# Patient Record
Sex: MALE | Race: BLACK OR AFRICAN AMERICAN | NOT HISPANIC OR LATINO | URBAN - METROPOLITAN AREA
[De-identification: names, ages, dates, MRNs, and addresses within clinical notes are randomized per-mention and may not be internally consistent; named-entity substitution may affect disease eponyms.]

---

## 2017-04-24 ENCOUNTER — EMERGENCY (EMERGENCY)
Facility: HOSPITAL | Age: 27
LOS: 1 days | Discharge: HOME | End: 2017-04-24
Attending: EMERGENCY MEDICINE | Admitting: EMERGENCY MEDICINE
Payer: MEDICAID

## 2017-04-24 VITALS
RESPIRATION RATE: 18 BRPM | TEMPERATURE: 99 F | SYSTOLIC BLOOD PRESSURE: 129 MMHG | OXYGEN SATURATION: 98 % | HEART RATE: 87 BPM | DIASTOLIC BLOOD PRESSURE: 78 MMHG

## 2017-04-24 VITALS
WEIGHT: 186.95 LBS | RESPIRATION RATE: 16 BRPM | HEIGHT: 73 IN | HEART RATE: 89 BPM | TEMPERATURE: 98 F | SYSTOLIC BLOOD PRESSURE: 94 MMHG | OXYGEN SATURATION: 99 % | DIASTOLIC BLOOD PRESSURE: 52 MMHG

## 2017-04-24 DIAGNOSIS — R45.1 RESTLESSNESS AND AGITATION: ICD-10-CM

## 2017-04-24 DIAGNOSIS — F31.9 BIPOLAR DISORDER, UNSPECIFIED: ICD-10-CM

## 2017-04-24 DIAGNOSIS — R46.1 BIZARRE PERSONAL APPEARANCE: ICD-10-CM

## 2017-04-24 LAB
AMPHET UR-MCNC: NEGATIVE — SIGNIFICANT CHANGE UP
APAP SERPL-MCNC: <7.5 UG/ML — LOW (ref 10–26)
APPEARANCE UR: CLEAR — SIGNIFICANT CHANGE UP
BACTERIA # UR AUTO: ABNORMAL
BARBITURATES UR SCN-MCNC: NEGATIVE — SIGNIFICANT CHANGE UP
BENZODIAZ UR-MCNC: POSITIVE
BILIRUB UR-MCNC: NEGATIVE — SIGNIFICANT CHANGE UP
COCAINE METAB.OTHER UR-MCNC: NEGATIVE — SIGNIFICANT CHANGE UP
COLOR SPEC: YELLOW — SIGNIFICANT CHANGE UP
DIFF PNL FLD: NEGATIVE — SIGNIFICANT CHANGE UP
EOSINOPHIL # BLD AUTO: 0 K/UL — SIGNIFICANT CHANGE UP (ref 0–0.5)
EOSINOPHIL NFR BLD AUTO: 0.2 % — SIGNIFICANT CHANGE UP (ref 0–5)
EPI CELLS # UR: SIGNIFICANT CHANGE UP
ETHANOL SERPL-MCNC: <10 MG/DL — SIGNIFICANT CHANGE UP
GLUCOSE UR QL: NEGATIVE MG/DL — SIGNIFICANT CHANGE UP
HCT VFR BLD CALC: 39.7 % — LOW (ref 42–52)
HGB BLD-MCNC: 13.1 G/DL — LOW (ref 14–18)
KETONES UR-MCNC: ABNORMAL
LEUKOCYTE ESTERASE UR-ACNC: ABNORMAL
LYMPHOCYTES # BLD AUTO: 1 K/UL — SIGNIFICANT CHANGE UP (ref 1–4.8)
LYMPHOCYTES # BLD AUTO: 9 % — LOW (ref 20–55)
MCHC RBC-ENTMCNC: 25.8 PG — LOW (ref 27–31)
MCHC RBC-ENTMCNC: 33 G/DL — SIGNIFICANT CHANGE UP (ref 32–36)
MCV RBC AUTO: 78.3 FL — LOW (ref 80–94)
METHADONE UR-MCNC: NEGATIVE — SIGNIFICANT CHANGE UP
MONOCYTES # BLD AUTO: 0.7 K/UL — SIGNIFICANT CHANGE UP (ref 0–0.8)
MONOCYTES NFR BLD AUTO: 6.9 % — SIGNIFICANT CHANGE UP (ref 3–10)
NEUTROPHILS # BLD AUTO: 8.9 K/UL — HIGH (ref 1.8–8)
NEUTROPHILS NFR BLD AUTO: 83.8 % — HIGH (ref 37–73)
NITRITE UR-MCNC: NEGATIVE — SIGNIFICANT CHANGE UP
OPIATES UR-MCNC: NEGATIVE — SIGNIFICANT CHANGE UP
PCP SPEC-MCNC: SIGNIFICANT CHANGE UP
PCP UR-MCNC: NEGATIVE — SIGNIFICANT CHANGE UP
PH UR: 6 — SIGNIFICANT CHANGE UP (ref 5–8)
PLATELET # BLD AUTO: 280 K/UL — SIGNIFICANT CHANGE UP (ref 150–400)
PROT UR-MCNC: 15 MG/DL
RBC # BLD: 5.07 M/UL — SIGNIFICANT CHANGE UP (ref 4.6–6.2)
RBC # FLD: 13.3 % — SIGNIFICANT CHANGE UP (ref 11–15.6)
SALICYLATES SERPL-MCNC: <2 MG/DL — LOW (ref 10–20)
SP GR SPEC: 1.02 — SIGNIFICANT CHANGE UP (ref 1.01–1.02)
THC UR QL: NEGATIVE — SIGNIFICANT CHANGE UP
TSH SERPL-MCNC: 3.71 UIU/ML — SIGNIFICANT CHANGE UP (ref 0.27–4.2)
UROBILINOGEN FLD QL: NEGATIVE MG/DL — SIGNIFICANT CHANGE UP
WBC # BLD: 10.6 K/UL — SIGNIFICANT CHANGE UP (ref 4.8–10.8)
WBC # FLD AUTO: 10.6 K/UL — SIGNIFICANT CHANGE UP (ref 4.8–10.8)
WBC UR QL: SIGNIFICANT CHANGE UP

## 2017-04-24 PROCEDURE — 99284 EMERGENCY DEPT VISIT MOD MDM: CPT | Mod: 25

## 2017-04-24 PROCEDURE — 93005 ELECTROCARDIOGRAM TRACING: CPT

## 2017-04-24 PROCEDURE — 81001 URINALYSIS AUTO W/SCOPE: CPT

## 2017-04-24 PROCEDURE — 80053 COMPREHEN METABOLIC PANEL: CPT

## 2017-04-24 PROCEDURE — 80307 DRUG TEST PRSMV CHEM ANLYZR: CPT

## 2017-04-24 PROCEDURE — 99284 EMERGENCY DEPT VISIT MOD MDM: CPT

## 2017-04-24 PROCEDURE — 90792 PSYCH DIAG EVAL W/MED SRVCS: CPT

## 2017-04-24 PROCEDURE — 93010 ELECTROCARDIOGRAM REPORT: CPT

## 2017-04-24 PROCEDURE — 85027 COMPLETE CBC AUTOMATED: CPT

## 2017-04-24 PROCEDURE — 84443 ASSAY THYROID STIM HORMONE: CPT

## 2017-04-24 NOTE — ED ADULT NURSE REASSESSMENT NOTE - NS ED NURSE REASSESS COMMENT FT1
report given to Jesus in behavior health.
Patient spoke to MD and RN. Mood is agitated and minimal disclosure. Minimizing reason for coming to hospital, and reports he was just discharged last week from [facility] last week. Focused on leaving and going back home. Pt stated he asked for "things he wanted at work", and he "got mad" when he didn't get them. Pt stated he then calmed down, but was brought to hospital against his will after police was called. MD assessment ongoing to determine appropriate disposition plan.

## 2017-04-24 NOTE — ED BEHAVIORAL HEALTH ASSESSMENT NOTE - SAFETY PLAN DETAILS
Please go to [facility] or call 911, If you notice any of the followin. Agitation, Aggression or Anxiety,    2. Suicidal or homicidal thoughts 3. Side effects of medication  4. Worsening of symptoms

## 2017-04-24 NOTE — ED BEHAVIORAL HEALTH ASSESSMENT NOTE - DESCRIPTION
Patient is tall, and appears intimidating with blunted affect. He did not exhibit any violence or aggression. He was cooperative throughout. h/o heart surgery Family is from [facility], but pt was born here. He has one brother and one sister. Lives with parents

## 2017-04-24 NOTE — ED ADULT TRIAGE NOTE - CHIEF COMPLAINT QUOTE
BIBA, patient is awake and oriented to person and place, sent to the Ed for eval of a psychiatric outburst, patient was given IM haldol and IM versed, denies any si, but states he may hurt someone if provoked

## 2017-04-24 NOTE — ED BEHAVIORAL HEALTH ASSESSMENT NOTE - SUMMARY
Patient is a 26 year old, male; domicile with parents and 2 siblings; single; non-caregiver; unemployed ; PPH of reported bipolar disorder;  with two prior psychiatric hospitalizations-last discharged two weeks ago from [facility]; no known suicide attempts; no known history of violence or arrests; no active substance abuse or known history of complicated withdrawal; brought in by EMS; called by work agency ; presenting for evaluation of  agitation.  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  Patient refused to leave until they gave him a job and police were called.  A confrontation ensued, Patient reports he cursed at police and they gave him PRN, he denies any physical aggression towards anyone.  He denies any S/H I/I/P. As per father, pt has not been violent or aggressive since discharge from hospital. He has not taken any medications because he is "too busy".  He was in good behavioral control in ER, and verbalized that he would have to look for job elsewhere.  No need for acute inpatient psychiatric hospitalization. Encouraged pt to restart medications which he reported he would consider.  Patient to follow up this week at [facility]

## 2017-04-24 NOTE — ED BEHAVIORAL HEALTH ASSESSMENT NOTE - RISK ASSESSMENT
Low to moderate: Patient is not currently psychotic, denies depressed mood, denies h/o violence, aggression or suicidality, no prior suicide attempts, good support, remains future oriented, no substance use history, denies high psychic anxiety, denies insomnia, denies hopelessness,  he does have concrete TP, with limited insight and impulsivity which increase his chronic risk

## 2017-04-24 NOTE — ED BEHAVIORAL HEALTH ASSESSMENT NOTE - HPI (INCLUDE ILLNESS QUALITY, SEVERITY, DURATION, TIMING, CONTEXT, MODIFYING FACTORS, ASSOCIATED SIGNS AND SYMPTOMS)
Patient is a 26 year old, male; domicile with parents and 2 siblings; single; non-caregiver; unemployed ; PPH of reported bipolar disorder;  with two prior psychiatric hospitalizations-last discharged two weeks ago from [facility]; no known suicide attempts; no known history of violence or arrests; no active substance abuse or known history of complicated withdrawal; brought in by EMS; called by work agency ; presenting for evaluation of  agitation      pt was recently discharged from [facility] two weeks ago. He has outpatient follow up this week. Today pt went to [facility] because he wanted to get his job back.  He reports that he brought paper work from hospital discharged but they refused to give him his job back.  Patient reported that he was not going to leave until his job was given to him.  [employer] was called.  Patient denies any physical violence however admits to cursing at officers.  He received Halsol 5 mg and 5 mg of Versed at 14:12. He received a second dose of versed at 14:23. Patient father arrived before departure from the scene and pt was brought to [facility] for further evaluation.       Patient admits that he has not taken medications since discharge because he has been "too busy".  He feels the agency was wrong to give him his job, but concedes that he will have to look elsewhere for work. Concerning other psychiatric symptoms pt denies depressed mood, continues to enjoy  pleasurable activities.   Denies any suicidal ideation, intent or plan. Denies any physically  violent behavior towards others.  Patient admits to feeling irritable, but he denies any episodes of bizarre happiness, unusual energy, unusual nightime excitation or other common symptoms of sana.  Denies hearing voices or seeing things.  No delusions were elicited.  Patient denies pervasive anxiety,  panic attacks, obsessions or compulsions.       Spoke with father who reported that pt has been doing well since discharge.  He reports that he was not given back to work papers from hospital since discharge but pt did not want to accept that and wanted to return to work.  He reported that pt did not want to leave agency until they gave him work. He confirmed that pt had not been physically violent, or engaging in any dangerous or suicidal behavior since discharge.  He does not believe that pt takes medications. He feels comfortable with pt being discharged back home. He reports that pt likes to work and that the other subject that he talks about is getting a girlfriend.

## 2017-04-24 NOTE — ED BEHAVIORAL HEALTH NOTE - BEHAVIORAL HEALTH NOTE
SWNote: pt seen by psych MD (Dr Garza) .Pt denies any SI/HI at this moment,states that he has an appt at [facility] on Wednesday 04/25/2017 pt will f/u accordingly. Pt's father on his way to  pt. No other concerns reported at this moment.

## 2017-04-24 NOTE — ED BEHAVIORAL HEALTH ASSESSMENT NOTE - SUICIDE PROTECTIVE FACTORS
Supportive social network or family/Responsibility to family and others/Identifies reasons for living/Future oriented

## 2017-04-24 NOTE — ED BEHAVIORAL HEALTH ASSESSMENT NOTE - DETAILS
Discharged from [place] [facility] 2 weeks ago no available Father's aunt with h/o bipolar disorder. H/o psychiatric illness on mother's side NA

## 2017-04-24 NOTE — ED BEHAVIORAL HEALTH ASSESSMENT NOTE - OTHER PAST PSYCHIATRIC HISTORY (INCLUDE DETAILS REGARDING ONSET, COURSE OF ILLNESS, INPATIENT/OUTPATIENT TREATMENT)
Patient has two prior psychiatric hospitalizations. The first one was 7 years ago when he presented to [place] [facility] and more recently at [facility].  The last trigger occurred when pt became irritable and possibly paranoid about  when he brought in car for a fix.  Patient has no known suicide history and history of non compliance as an outpatient

## 2017-04-24 NOTE — ED ADULT NURSE NOTE - OBJECTIVE STATEMENT
26 year old a&ox3 male comes to ed. as per pt. he states he came home and there were  at his house. pt. not willingly to talk about what happened. pt. appears frustrated. pt. states he just wants to go home. iv placed. labs sent. will continue to monitor.

## 2017-04-25 NOTE — ED PROVIDER NOTE - OBJECTIVE STATEMENT
pt presents with increased agitation after being refused to be allowed to return to work. denies fever. denies HA or neck pain. no chest pain or sob. no abd pain. no n/v/d. no urinary f/u/d. no back pain. no motor or sensory deficits. denies drug use. no recent travel. no rash. no other acute issues symptoms or concerns no suicidalor homicidal ideation no hallcuinations he is complaint with medications

## 2017-08-22 ENCOUNTER — OUTPATIENT (OUTPATIENT)
Dept: OUTPATIENT SERVICES | Facility: HOSPITAL | Age: 27
LOS: 1 days | End: 2017-08-22
Payer: MEDICAID

## 2017-08-22 VITALS
TEMPERATURE: 98 F | OXYGEN SATURATION: 99 % | DIASTOLIC BLOOD PRESSURE: 67 MMHG | RESPIRATION RATE: 16 BRPM | HEART RATE: 76 BPM | SYSTOLIC BLOOD PRESSURE: 125 MMHG

## 2017-08-22 DIAGNOSIS — Z98.890 OTHER SPECIFIED POSTPROCEDURAL STATES: Chronic | ICD-10-CM

## 2017-08-22 DIAGNOSIS — Z01.810 ENCOUNTER FOR PREPROCEDURAL CARDIOVASCULAR EXAMINATION: ICD-10-CM

## 2017-08-22 LAB
ALBUMIN SERPL ELPH-MCNC: 3.7 G/DL — SIGNIFICANT CHANGE UP (ref 3.3–5.2)
ALP SERPL-CCNC: 92 U/L — SIGNIFICANT CHANGE UP (ref 40–120)
ALT FLD-CCNC: 23 U/L — SIGNIFICANT CHANGE UP
ANION GAP SERPL CALC-SCNC: 13 MMOL/L — SIGNIFICANT CHANGE UP (ref 5–17)
APTT BLD: 28.4 SEC — SIGNIFICANT CHANGE UP (ref 27.5–37.4)
AST SERPL-CCNC: 17 U/L — SIGNIFICANT CHANGE UP
BILIRUB SERPL-MCNC: 0.3 MG/DL — LOW (ref 0.4–2)
BUN SERPL-MCNC: 12 MG/DL — SIGNIFICANT CHANGE UP (ref 8–20)
CALCIUM SERPL-MCNC: 9.2 MG/DL — SIGNIFICANT CHANGE UP (ref 8.6–10.2)
CHLORIDE SERPL-SCNC: 104 MMOL/L — SIGNIFICANT CHANGE UP (ref 98–107)
CO2 SERPL-SCNC: 23 MMOL/L — SIGNIFICANT CHANGE UP (ref 22–29)
CREAT SERPL-MCNC: 1.02 MG/DL — SIGNIFICANT CHANGE UP (ref 0.5–1.3)
GLUCOSE SERPL-MCNC: 86 MG/DL — SIGNIFICANT CHANGE UP (ref 70–115)
HCT VFR BLD CALC: 37.9 % — LOW (ref 42–52)
HGB BLD-MCNC: 12.6 G/DL — LOW (ref 14–18)
INR BLD: 1.12 RATIO — SIGNIFICANT CHANGE UP (ref 0.88–1.16)
MCHC RBC-ENTMCNC: 26.1 PG — LOW (ref 27–31)
MCHC RBC-ENTMCNC: 33.2 G/DL — SIGNIFICANT CHANGE UP (ref 32–36)
MCV RBC AUTO: 78.6 FL — LOW (ref 80–94)
PLATELET # BLD AUTO: 232 K/UL — SIGNIFICANT CHANGE UP (ref 150–400)
POTASSIUM SERPL-MCNC: 4.1 MMOL/L — SIGNIFICANT CHANGE UP (ref 3.5–5.3)
POTASSIUM SERPL-SCNC: 4.1 MMOL/L — SIGNIFICANT CHANGE UP (ref 3.5–5.3)
PROT SERPL-MCNC: 7.1 G/DL — SIGNIFICANT CHANGE UP (ref 6.6–8.7)
PROTHROM AB SERPL-ACNC: 12.3 SEC — SIGNIFICANT CHANGE UP (ref 9.8–12.7)
RBC # BLD: 4.82 M/UL — SIGNIFICANT CHANGE UP (ref 4.6–6.2)
RBC # FLD: 13.8 % — SIGNIFICANT CHANGE UP (ref 11–15.6)
SODIUM SERPL-SCNC: 140 MMOL/L — SIGNIFICANT CHANGE UP (ref 135–145)
WBC # BLD: 5.6 K/UL — SIGNIFICANT CHANGE UP (ref 4.8–10.8)
WBC # FLD AUTO: 5.6 K/UL — SIGNIFICANT CHANGE UP (ref 4.8–10.8)

## 2017-08-22 PROCEDURE — 85730 THROMBOPLASTIN TIME PARTIAL: CPT

## 2017-08-22 PROCEDURE — 85610 PROTHROMBIN TIME: CPT

## 2017-08-22 PROCEDURE — 36415 COLL VENOUS BLD VENIPUNCTURE: CPT

## 2017-08-22 PROCEDURE — G0463: CPT

## 2017-08-22 PROCEDURE — 93010 ELECTROCARDIOGRAM REPORT: CPT

## 2017-08-22 PROCEDURE — 80053 COMPREHEN METABOLIC PANEL: CPT

## 2017-08-22 PROCEDURE — 85027 COMPLETE CBC AUTOMATED: CPT

## 2017-08-22 PROCEDURE — 93005 ELECTROCARDIOGRAM TRACING: CPT

## 2017-08-22 NOTE — H&P ADULT - NSHPLABSRESULTS_GEN_ALL_CORE
12.6   5.6   )-----------( 232      ( 22 Aug 2017 09:04 )             37.9     PT/INR - ( 22 Aug 2017 09:04 )   PT: 12.3 sec;   INR: 1.12 ratio         PTT - ( 22 Aug 2017 09:04 )  PTT:28.4 sec    Comprehensive Metabolic Panel (08.22.17 @ 09:04)    Sodium, Serum: 140 mmol/L    Potassium, Serum: 4.1 mmol/L    Chloride, Serum: 104 mmol/L    Carbon Dioxide, Serum: 23.0 mmol/L    Anion Gap, Serum: 13 mmol/L    Blood Urea Nitrogen, Serum: 12.0 mg/dL    Creatinine, Serum: 1.02 mg/dL    Glucose, Serum: 86 mg/dL    Calcium, Total Serum: 9.2 mg/dL    Protein Total, Serum: 7.1 g/dL    Albumin, Serum: 3.7 g/dL    Bilirubin Total, Serum: 0.3 mg/dL    Alkaline Phosphatase, Serum: 92 U/L    Aspartate Aminotransferase (AST/SGOT): 17 U/L    Alanine Aminotransferase (ALT/SGPT): 23 U/L    eGFR if Non : 100: Interpretative comment  The units for eGFR are ml/min/1.73m2 (normalized body surface area). The  eGFR is calculated from a serum creatinine using the CKD-EPI equation.  Other variables required for calculation are race, age and sex. Among  patients w100: ith chronic kidney disease (CKD), the eGFR is useful in  determining the stage of disease according to KDOQI CKD classification.  All eGFR results are reported numerically with the following  interpretation.          GFR                    [id number]:                  Without     (ml/min/1.73 m2)    Kidney Damage       Kidney Damage        >= 90                    Stage 1                     Normal        60-89                    Stage 2                     Decreased GFR        [id number]:      Stage 3                     Stage 3        15-29                    Stage 4                     Stage 4        < 15                      Stage 5                     Stage 5  Each stage of CKD assumes that the associated GFR level has been in  [id number]: ect for at least 3 months. Determination of stages one and two (with  eGFR > 59 ml/min/m2) requires estimation of kidney damage for at least 3  months as defined by structural or functional abnormalities.  Limitations: All estimates of GFR will be [id number]: s accurate for patients at  extremes of muscle mass (including but not limited to frail elderly,  critically ill, or cancer patients), those with unusual diets, and those  with conditions associated with reduced secretion or extrarenal  elimination of100:  creatinine. The eGFR equation is not recommended for use  in patients with unstable creatinine levels. mL/min/1.73M2    eGFR if African American: 115 mL/min/1.73M2

## 2017-08-22 NOTE — ASU PATIENT PROFILE, ADULT - PSH
Status post closure of congenital atrial septal defect (ASD) by percutaneous transcatheter technique

## 2017-08-22 NOTE — H&P ADULT - NSHPPHYSICALEXAM_GEN_ALL_CORE
General: NAD, well-groomed, well-developed  HEENT:  NC/AT  Neck: supple, no JVD, no bruit noted  Respiratory: clear to auscultation bilaterally, no wheeze, no rhonchi, no crackles noted  Cardiovascular: regular rate and rhythm, S1 and S2 audible, no murmur, gallop or rub noted  GI: bowel sounds present x4, abdomen soft, non tender, non distended  Peripheral Vascular: 2+ peripheral pulses, no clubbing, cyanosis or edema, no varicosities noted  Musculoskeletal: 5/5 strength bilateral upper and lower extremities  Psychiatric: Normal mood, normal affect observed  Neuro: alert and oriented x 4, gait steady, speech clear, no focal deficits noted  ASA-2  MALLAMPATI-2

## 2017-08-22 NOTE — H&P ADULT - NSHPREVIEWOFSYSTEMS_GEN_ALL_CORE
General:  no weakness, fatigue, fevers or chills  Skin: no itching, burning, rashes, or lesions   HEENT: no visual changes;  no headache, no vertigo, no recent colds, nasal stuffiness or sore throat   Neck: no pain, stiffness or swollen glands  Respiratory: no cough, sputum, wheezing, hemoptysis; no shortness of breath  Cardiovascular: + rare right sided chest pain, no dyspnea or palpitations  GI: no abdominal or epigastric pain. no heartburn, nausea, vomiting, or hematemesis; no diarrhea or constipation. no melena or hematochezia  : no dysuria, frequency or hematuria  Peripheral Vascular: no intermittent claudication, leg cramps, varicose veins, swelling or swelling with redness and tenderness  Musculoskeletal: no muscle or joint pain, stiffness, arthritis, gout, backache, neck or lower back pain  Psychiatric: no depression, nervousness, mood change or suicide attempts  Neuro: no changes in orientation, memory, insight or judgment, no changes in mood, attention or speech.  no dizziness, vertigo or fainting, numbness, tingling or weakness

## 2017-08-22 NOTE — H&P ADULT - NSHPSOCIALHISTORY_GEN_ALL_CORE
single  lives with parents  unemployed    alcohol use-rare/social  tobacco use-denies  illicit drug use-denies

## 2017-08-22 NOTE — H&P ADULT - HISTORY OF PRESENT ILLNESS
26 year old male with PMHx of ASD closure with right sided enlargement and dysfunction, schizophrenia, depression that presents for RHC to evaluate RV/PA pressures.

## 2017-08-25 ENCOUNTER — TRANSCRIPTION ENCOUNTER (OUTPATIENT)
Age: 27
End: 2017-08-25

## 2017-08-25 ENCOUNTER — OUTPATIENT (OUTPATIENT)
Dept: OUTPATIENT SERVICES | Facility: HOSPITAL | Age: 27
LOS: 1 days | End: 2017-08-25
Payer: MEDICAID

## 2017-08-25 VITALS
OXYGEN SATURATION: 100 % | HEART RATE: 74 BPM | DIASTOLIC BLOOD PRESSURE: 70 MMHG | RESPIRATION RATE: 20 BRPM | SYSTOLIC BLOOD PRESSURE: 120 MMHG

## 2017-08-25 VITALS
DIASTOLIC BLOOD PRESSURE: 62 MMHG | RESPIRATION RATE: 22 BRPM | HEART RATE: 78 BPM | TEMPERATURE: 98 F | SYSTOLIC BLOOD PRESSURE: 114 MMHG | OXYGEN SATURATION: 100 %

## 2017-08-25 DIAGNOSIS — Z98.890 OTHER SPECIFIED POSTPROCEDURAL STATES: Chronic | ICD-10-CM

## 2017-08-25 DIAGNOSIS — Q21.1 ATRIAL SEPTAL DEFECT: ICD-10-CM

## 2017-08-25 DIAGNOSIS — Z01.810 ENCOUNTER FOR PREPROCEDURAL CARDIOVASCULAR EXAMINATION: ICD-10-CM

## 2017-08-25 PROCEDURE — 93451 RIGHT HEART CATH: CPT

## 2017-08-25 PROCEDURE — C1769: CPT

## 2017-08-25 PROCEDURE — C1889: CPT

## 2017-08-25 PROCEDURE — C1894: CPT

## 2017-08-25 PROCEDURE — 93451 RIGHT HEART CATH: CPT | Mod: 26

## 2017-08-25 PROCEDURE — C1887: CPT

## 2017-08-25 RX ORDER — VENLAFAXINE HCL 75 MG
1 CAPSULE, EXT RELEASE 24 HR ORAL
Qty: 0 | Refills: 0 | COMMUNITY

## 2017-08-25 RX ORDER — DIVALPROEX SODIUM 500 MG/1
1 TABLET, DELAYED RELEASE ORAL
Qty: 0 | Refills: 0 | COMMUNITY

## 2017-08-25 RX ORDER — ASPIRIN/CALCIUM CARB/MAGNESIUM 324 MG
325 TABLET ORAL ONCE
Qty: 0 | Refills: 0 | Status: COMPLETED | OUTPATIENT
Start: 2017-08-25 | End: 2017-08-25

## 2017-08-25 RX ORDER — FLUPHENAZINE HYDROCHLORIDE 1 MG/1
1 TABLET, FILM COATED ORAL
Qty: 0 | Refills: 0 | COMMUNITY

## 2017-08-25 RX ORDER — SODIUM CHLORIDE 9 MG/ML
1000 INJECTION INTRAMUSCULAR; INTRAVENOUS; SUBCUTANEOUS
Qty: 0 | Refills: 0 | Status: DISCONTINUED | OUTPATIENT
Start: 2017-08-25 | End: 2017-09-09

## 2017-08-25 RX ADMIN — Medication 325 MILLIGRAM(S): at 10:49

## 2017-08-25 NOTE — DISCHARGE NOTE ADULT - PATIENT PORTAL LINK FT
“You can access the FollowHealth Patient Portal, offered by [facility], by registering with the following website: [email or web address]”

## 2017-08-25 NOTE — DISCHARGE NOTE ADULT - PLAN OF CARE
optimal cardiac function Choose lean meats and poultry without skin and prepare them without added saturated and trans fat.  Eat fish at least twice a week. Recent research shows that eating oily fish containing omega-3 fatty acids (for example, salmon, trout and herring) may help lower your risk of death from coronary artery disease.  Select fat-free, 1 percent fat and low-fat dairy products.  Cut back on foods containing partially hydrogenated vegetable oils to reduce trans fat in your diet.   To lower cholesterol, reduce saturated fat to no more than 5 to 6 percent of total calories. For someone eating 2,000 calories a day, that’s about 13 grams of saturated fat.  Cut back on beverages and foods with added sugars.  Choose and prepare foods with little or no salt. To lower blood pressure, aim to eat no more than 2,400 milligrams of sodium per day. Reducing daily intake to 1,500 mg is desirable because it can lower blood pressure even further.  If you drink alcohol, drink in moderation. That means one drink per day if you’re a woman and two drinks  per day if you’re a man.  Follow the American Heart Association recommendations when you eat out, and keep an eye on your portion sizes.

## 2017-08-25 NOTE — DISCHARGE NOTE ADULT - HOSPITAL COURSE
s/p RHC #5 fr RFV s/p RHC #5 fr RFV   Tolerated procedure well w/o complications  Seen post procedure by Dr. Rai  Family present  REVIEW OF SYSTEMS:  Denies SOB, CP, NV, HA, dizziness, palpitations, site pain  PHYSICAL EXAM: A&Ox3 NAD Skin warm and dry  NEURO: Speech intact +gag +swallow Tongue midline WILD  NECK: No JVD, trachea midline. Eupneic  HEART: RRR S1S2 Tele: SR  PULMONARY:  CTA flavia  ABDOMEN: Soft nontender X4 +BS  EXTREMITIES: RFV site: No bleed, hematoma, pain, ecchymosis or swelling Rt DP/PT+

## 2017-08-25 NOTE — DISCHARGE NOTE ADULT - MEDICATION SUMMARY - MEDICATIONS TO TAKE
I will START or STAY ON the medications listed below when I get home from the hospital:    divalproex sodium 500 mg oral tablet, extended release  -- 1 tab(s) by mouth 2 times a day  -- Indication: For schizophrenic    venlafaxine 37.5 mg oral capsule, extended release  -- 1 cap(s) by mouth once a day  -- Indication: For ASD (atrial septal defect)    fluPHENAZine 5 mg oral tablet  -- 1 tab(s) by mouth 2 times a day  -- Indication: For ASD (atrial septal defect)

## 2017-08-25 NOTE — DISCHARGE NOTE ADULT - CARE PLAN
Principal Discharge DX:	ASD (atrial septal defect)  Goal:	optimal cardiac function  Instructions for follow-up, activity and diet:	Choose lean meats and poultry without skin and prepare them without added saturated and trans fat.  Eat fish at least twice a week. Recent research shows that eating oily fish containing omega-3 fatty acids (for example, salmon, trout and herring) may help lower your risk of death from coronary artery disease.  Select fat-free, 1 percent fat and low-fat dairy products.  Cut back on foods containing partially hydrogenated vegetable oils to reduce trans fat in your diet.   To lower cholesterol, reduce saturated fat to no more than 5 to 6 percent of total calories. For someone eating 2,000 calories a day, that’s about 13 grams of saturated fat.  Cut back on beverages and foods with added sugars.  Choose and prepare foods with little or no salt. To lower blood pressure, aim to eat no more than 2,400 milligrams of sodium per day. Reducing daily intake to 1,500 mg is desirable because it can lower blood pressure even further.  If you drink alcohol, drink in moderation. That means one drink per day if you’re a woman and two drinks  per day if you’re a man.  Follow the American Heart Association recommendations when you eat out, and keep an eye on your portion sizes.

## 2017-08-25 NOTE — DISCHARGE NOTE ADULT - INSTRUCTIONS
Activities as tolerated minimize stair climbing, heavy lifting greater than 10lbs, strenous house work, contact sports,No intercourse for 1 week. Site care no Bath tubs, Hot tubs , Pools for 1 week. Monitor site for infection such as warmth drainage or swelling of site. Monitor for bleeding call MD if continous bleeding occurs hold pressure report to nearest ER, Do not opperate heavy machinery or drive for 24hours.  REMOVE ALL DRESSINGS IN 24 HOURS discharge instructions

## 2018-01-31 ENCOUNTER — RECORD ABSTRACTING (OUTPATIENT)
Age: 28
End: 2018-01-31

## 2018-01-31 DIAGNOSIS — Z86.59 PERSONAL HISTORY OF OTHER MENTAL AND BEHAVIORAL DISORDERS: ICD-10-CM

## 2018-01-31 DIAGNOSIS — Z78.9 OTHER SPECIFIED HEALTH STATUS: ICD-10-CM

## 2018-01-31 DIAGNOSIS — F32.9 MAJOR DEPRESSIVE DISORDER, SINGLE EPISODE, UNSPECIFIED: ICD-10-CM

## 2018-01-31 DIAGNOSIS — R74.8 ABNORMAL LEVELS OF OTHER SERUM ENZYMES: ICD-10-CM

## 2018-01-31 DIAGNOSIS — F20.9 SCHIZOPHRENIA, UNSPECIFIED: ICD-10-CM

## 2018-10-31 ENCOUNTER — RECORD ABSTRACTING (OUTPATIENT)
Age: 28
End: 2018-10-31

## 2018-11-01 ENCOUNTER — APPOINTMENT (OUTPATIENT)
Dept: CARDIOLOGY | Facility: CLINIC | Age: 28
End: 2018-11-01
Payer: MEDICAID

## 2018-11-01 VITALS
SYSTOLIC BLOOD PRESSURE: 113 MMHG | HEIGHT: 73 IN | RESPIRATION RATE: 14 BRPM | DIASTOLIC BLOOD PRESSURE: 72 MMHG | WEIGHT: 132 LBS | BODY MASS INDEX: 17.49 KG/M2 | OXYGEN SATURATION: 97 % | HEART RATE: 76 BPM

## 2018-11-01 PROCEDURE — 99214 OFFICE O/P EST MOD 30 MIN: CPT

## 2018-11-01 PROCEDURE — 93000 ELECTROCARDIOGRAM COMPLETE: CPT

## 2018-11-02 PROBLEM — F32.9 MAJOR DEPRESSIVE DISORDER, SINGLE EPISODE, UNSPECIFIED: Chronic | Status: ACTIVE | Noted: 2017-08-22

## 2018-11-02 PROBLEM — Q21.1 ATRIAL SEPTAL DEFECT: Chronic | Status: ACTIVE | Noted: 2017-08-22

## 2018-11-02 PROBLEM — F20.9 SCHIZOPHRENIA, UNSPECIFIED: Chronic | Status: ACTIVE | Noted: 2017-08-22

## 2018-12-27 ENCOUNTER — APPOINTMENT (OUTPATIENT)
Dept: CARDIOLOGY | Facility: CLINIC | Age: 28
End: 2018-12-27
Payer: MEDICAID

## 2018-12-27 PROCEDURE — 93306 TTE W/DOPPLER COMPLETE: CPT

## 2019-04-18 ENCOUNTER — NON-APPOINTMENT (OUTPATIENT)
Age: 29
End: 2019-04-18

## 2019-04-18 ENCOUNTER — APPOINTMENT (OUTPATIENT)
Dept: CARDIOLOGY | Facility: CLINIC | Age: 29
End: 2019-04-18
Payer: MEDICAID

## 2019-04-18 VITALS
OXYGEN SATURATION: 98 % | BODY MASS INDEX: 31.81 KG/M2 | HEART RATE: 79 BPM | RESPIRATION RATE: 14 BRPM | SYSTOLIC BLOOD PRESSURE: 123 MMHG | WEIGHT: 240 LBS | HEIGHT: 73 IN | DIASTOLIC BLOOD PRESSURE: 85 MMHG

## 2019-04-18 PROCEDURE — 93000 ELECTROCARDIOGRAM COMPLETE: CPT

## 2019-04-18 PROCEDURE — 99214 OFFICE O/P EST MOD 30 MIN: CPT

## 2019-04-18 RX ORDER — CLONAZEPAM 2 MG/1
TABLET ORAL
Refills: 0 | Status: DISCONTINUED | COMMUNITY
End: 2019-04-18

## 2019-04-18 NOTE — PHYSICAL EXAM
[Normal Conjunctiva] : the conjunctiva exhibited no abnormalities [General Appearance - In No Acute Distress] : no acute distress [Normal Oral Mucosa] : normal oral mucosa [Normal Rate] : normal [Auscultation Breath Sounds / Voice Sounds] : lungs were clear to auscultation bilaterally [Rhythm Regular] : regular [Normal S1] : normal S1 [Normal S2] : normal S2 [II] : a grade 2 [Abdomen Soft] : soft [Abdomen Tenderness] : non-tender [Nail Clubbing] : no clubbing of the fingernails [Abnormal Walk] : normal gait [Skin Color & Pigmentation] : normal skin color and pigmentation [Cyanosis, Localized] : no localized cyanosis [Oriented To Time, Place, And Person] : oriented to person, place, and time [Affect] : the affect was normal [FreeTextEntry1] : No JVD, no carotid bruits. [S3] : no S3 [S4] : no S4

## 2019-04-18 NOTE — DISCUSSION/SUMMARY
[FreeTextEntry1] : 1. Cardiac CT to further evaluate for residual shunting or causes for right-sided enlargement.\par 2. No additional cardiac medications at this time.\par 3. He can exercise freely without restriction. \par 4. Will discuss the results of his CAT scan over the phone and plan followup in the office in 6 months.

## 2019-04-18 NOTE — ASSESSMENT
[FreeTextEntry1] : EKG:  SR with first degree AV block and RBBB.\par \par Echocardiogram December 27, 2018 showed left ventricle normal in size and function with ejection fraction of 55-60%. Right ventricle is moderately dilated with moderately reduced function. Right atrium is moderately dilated. No significant valvulopathy. Aortic root borderline dilated. Right-sided chambers appear further dilated compared to prior study.\par \par 28 y/o male with PMHx ASD s/p closure, right sided cardiomegaly presenting for f/u.  Patient is asymptomatic at this time. No evidence of CHF. Echocardiogram again shows right-sided enlargement but this seems to have worsened. Catheterization in 2017 showed normal pulmonary pressures and no evidence of residual shunt. Given the continued increase in size of his right side would recommend CAT scan to look further at this time.

## 2019-04-18 NOTE — HISTORY OF PRESENT ILLNESS
[FreeTextEntry1] : Patient returns to the office today noting he has been stressed to him that that has not been exercising. He reports no actual symptoms when he exerts himself but has not been exerting himself very much. He had an abscess drained in his abdominal wall. He has had no further chest pain. Patient denies shortness of breath, palpitations, orthopnea, presyncope, syncope.

## 2019-05-08 NOTE — ED ADULT NURSE NOTE - CARDIO WDL
Nephrology Followup Note - [phone] - Dr Bahena / Dr Regalado / Dr Benz / Dr Frost / Dr Mamhood / Dr Mcgee / Dr Guzman / Dr Guerra  Pt seen and examined at bedside  Pt appears restless and anxious, not answering questions.   Not in apparent respiratory distress. Afebrile     Allergies:  oxycodone (Vomiting; Nausea)    Hospital Medications:   MEDICATIONS  (STANDING):  allopurinol 300 milliGRAM(s) Oral daily  carvedilol 25 milliGRAM(s) Oral every 12 hours  chlorhexidine 4% Liquid 1 Application(s) Topical <User Schedule>  filgrastim-sndz Injectable 480 MICROGram(s) SubCutaneous daily  FIRST- Mouthwash  BLM 5 milliLiter(s) Swish and Spit five times a day  fluconAZOLE IVPB 200 milliGRAM(s) IV Intermittent every 24 hours  metroNIDAZOLE  IVPB 500 milliGRAM(s) IV Intermittent every 8 hours  metroNIDAZOLE  IVPB      ranitidine 150 milliGRAM(s) Oral every 24 hours  Saliva Substitute (CAPHOSOL) 30 milliLiter(s) Swish and Spit every 6 hours  valACYclovir 500 milliGRAM(s) Oral <User Schedule>  vancomycin    Solution 125 milliGRAM(s) Oral every 6 hours    VITALS:  T(F): 98 (19 @ 07:31), Max: 99 (19 @ 02:41)  HR: 108 (19 @ 07:31)  BP: 120/66 (19 @ 07:31)  RR: 20 (19 @ 07:31)  SpO2: 100% (19 @ 07:31)  Wt(kg): --     @ 07:01  -   @ 07:00  --------------------------------------------------------  IN: 0 mL / OUT: 450 mL / NET: -450 mL    PHYSICAL EXAM:  Constitutional: NAD  HEENT: anicteric sclera, bloody mucous with scabs.   Neck: No JVD  Respiratory: CTAB, no wheezes, rales or rhonchi  Cardiovascular: S1, S2, RRR  Gastrointestinal: BS+, soft, NT/ND  Extremities: No cyanosis or clubbing. No peripheral edema  Neurological: A/O x 0, no focal deficits  : No CVA tenderness. No quintero.   Skin: +purpura rash     LABS:      139  |  102  |  92<H>  ----------------------------<  158<H>  3.9   |  13<L>  |  9.68<H>    Ca    6.2<LL>      08 May 2019 06:38  Phos  5.7       Mg     2.2           Creatinine Trend: 9.68 <--, 7.77 <--, 8.51 <--, 6.88 <--, 8.02 <--, 6.69 <--, 5.21 <--                        6.3    3.26  )-----------( 33       ( 08 May 2019 06:38 )             18.9     Urine Studies:  Urinalysis Basic - ( 02 May 2019 14:30 )    Color: YELLOW / Appearance: Lt TURBID / S.015 / pH: 6.0  Gluc: NEGATIVE / Ketone: TRACE  / Bili: NEGATIVE / Urobili: NORMAL   Blood: SMALL / Protein: 300 / Nitrite: NEGATIVE   Leuk Esterase: NEGATIVE / RBC: 6-10 / WBC 11-25   Sq Epi: FEW / Non Sq Epi:  / Bacteria: NF      Sodium, Random Urine: 24 mmol/L ( @ 18:30)  Potassium, Random Urine: 28.6 mmol/L ( @ 18:30)  Chloride, Random Urine: < 20 mmol/L ( @ 18:30)  Osmolality, Random Urine: 307 mosmo/kg ( @ 18:30)    RADIOLOGY & ADDITIONAL STUDIES: Normal rate, regular rhythm, normal S1, S2 heart sounds heard.

## 2019-08-04 ENCOUNTER — FORM ENCOUNTER (OUTPATIENT)
Age: 29
End: 2019-08-04

## 2019-08-05 ENCOUNTER — OUTPATIENT (OUTPATIENT)
Dept: OUTPATIENT SERVICES | Facility: HOSPITAL | Age: 29
LOS: 1 days | End: 2019-08-05
Payer: MEDICAID

## 2019-08-05 DIAGNOSIS — Q21.1 ATRIAL SEPTAL DEFECT: ICD-10-CM

## 2019-08-05 DIAGNOSIS — I51.7 CARDIOMEGALY: ICD-10-CM

## 2019-08-05 DIAGNOSIS — Z98.890 OTHER SPECIFIED POSTPROCEDURAL STATES: Chronic | ICD-10-CM

## 2019-08-05 LAB
ANION GAP SERPL CALC-SCNC: 10 MMOL/L — SIGNIFICANT CHANGE UP (ref 5–17)
BUN SERPL-MCNC: 14 MG/DL — SIGNIFICANT CHANGE UP (ref 8–20)
CALCIUM SERPL-MCNC: 9.4 MG/DL — SIGNIFICANT CHANGE UP (ref 8.6–10.2)
CHLORIDE SERPL-SCNC: 105 MMOL/L — SIGNIFICANT CHANGE UP (ref 98–107)
CO2 SERPL-SCNC: 23 MMOL/L — SIGNIFICANT CHANGE UP (ref 22–29)
CREAT SERPL-MCNC: 0.93 MG/DL — SIGNIFICANT CHANGE UP (ref 0.5–1.3)
GLUCOSE SERPL-MCNC: 88 MG/DL — SIGNIFICANT CHANGE UP (ref 70–115)
POTASSIUM SERPL-MCNC: 4.2 MMOL/L — SIGNIFICANT CHANGE UP (ref 3.5–5.3)
POTASSIUM SERPL-SCNC: 4.2 MMOL/L — SIGNIFICANT CHANGE UP (ref 3.5–5.3)
SODIUM SERPL-SCNC: 138 MMOL/L — SIGNIFICANT CHANGE UP (ref 135–145)

## 2019-08-05 PROCEDURE — 36415 COLL VENOUS BLD VENIPUNCTURE: CPT

## 2019-08-05 PROCEDURE — 75574 CT ANGIO HRT W/3D IMAGE: CPT | Mod: 26

## 2019-08-05 PROCEDURE — 80048 BASIC METABOLIC PNL TOTAL CA: CPT

## 2019-08-05 PROCEDURE — 75574 CT ANGIO HRT W/3D IMAGE: CPT

## 2020-03-24 ENCOUNTER — APPOINTMENT (OUTPATIENT)
Dept: CARDIOLOGY | Facility: CLINIC | Age: 30
End: 2020-03-24

## 2020-08-04 ENCOUNTER — NON-APPOINTMENT (OUTPATIENT)
Age: 30
End: 2020-08-04

## 2020-08-04 ENCOUNTER — APPOINTMENT (OUTPATIENT)
Dept: CARDIOLOGY | Facility: CLINIC | Age: 30
End: 2020-08-04
Payer: MEDICAID

## 2020-08-04 VITALS
DIASTOLIC BLOOD PRESSURE: 60 MMHG | RESPIRATION RATE: 14 BRPM | SYSTOLIC BLOOD PRESSURE: 112 MMHG | TEMPERATURE: 98.1 F | HEIGHT: 73 IN | BODY MASS INDEX: 27.96 KG/M2 | HEART RATE: 98 BPM | WEIGHT: 211 LBS

## 2020-08-04 DIAGNOSIS — Z00.00 ENCOUNTER FOR GENERAL ADULT MEDICAL EXAMINATION W/OUT ABNORMAL FINDINGS: ICD-10-CM

## 2020-08-04 PROCEDURE — 93000 ELECTROCARDIOGRAM COMPLETE: CPT

## 2020-08-04 PROCEDURE — 99213 OFFICE O/P EST LOW 20 MIN: CPT

## 2020-08-04 RX ORDER — THIAMINE HCL 100 MG
TABLET ORAL
Refills: 0 | Status: ACTIVE | COMMUNITY

## 2020-08-04 RX ORDER — ACETAMINOPHEN 500 MG
TABLET ORAL
Refills: 0 | Status: ACTIVE | COMMUNITY

## 2020-08-04 NOTE — DISCUSSION/SUMMARY
[FreeTextEntry1] : 1. Check echocardiogram to reevaluate his right side.\par 2. No additional cardiac medications at this time.\par 3. He can exercise freely without restriction. \par 4. Will obtain most recent bloodwork.\par 5. I will discuss the results of the echo over the phone. If all else is well plan followup here in one year.ts of his CAT scan over the phone and plan followup in the office in 6 months.

## 2020-08-04 NOTE — ASSESSMENT
[FreeTextEntry1] : Echocardiogram December 27, 2018 showed left ventricle normal in size and function with ejection fraction of 55-60%. Right ventricle is moderately dilated with moderately reduced function. Right atrium is moderately dilated. No significant valvulopathy. Aortic root borderline dilated. Right-sided chambers appear further dilated compared to prior study.\par \par EKG:  SR with iRBBB.\par \par 28 y/o male with PMHx ASD s/p closure, right sided cardiomegaly presenting for f/u.  Patient is asymptomatic at this time. No evidence of CHF. CTA last year showed mildly reduced LV function, no comment was made about the RV. No evidence of CAD. At this time I would like to repeat his echo to once again reassess his right side. I will also try to get blood work from earlier this year as he has not had any on my records for the last couple of years. However he appears to be quite stable from a cardiac standpoint at this time.

## 2020-08-04 NOTE — HISTORY OF PRESENT ILLNESS
[FreeTextEntry1] : Patient presents back today feeling generally very well. He reports some muscle cramps in his left upper chest from doing pushups and some cramps in his legs as he has started exercising again after a hiatus. Otherwise he reports no symptoms at all and has no other physical limitations on his exertion. Patient denies chest pain, shortness of breath, palpitations, orthopnea, presyncope, syncope.

## 2020-08-04 NOTE — PHYSICAL EXAM
[Normal Conjunctiva] : the conjunctiva exhibited no abnormalities [General Appearance - In No Acute Distress] : no acute distress [Normal Oral Mucosa] : normal oral mucosa [Auscultation Breath Sounds / Voice Sounds] : lungs were clear to auscultation bilaterally [Abdomen Soft] : soft [Abdomen Tenderness] : non-tender [Abnormal Walk] : normal gait [Cyanosis, Localized] : no localized cyanosis [Nail Clubbing] : no clubbing of the fingernails [Skin Color & Pigmentation] : normal skin color and pigmentation [Oriented To Time, Place, And Person] : oriented to person, place, and time [Affect] : the affect was normal [Normal Rate] : normal [Rhythm Regular] : regular [Normal S1] : normal S1 [Normal S2] : normal S2 [II] : a grade 2 [FreeTextEntry1] : No JVD, no carotid bruits. [S3] : no S3 [S4] : no S4

## 2020-09-16 NOTE — ED PROVIDER NOTE - CPE EDP RESP NORM
"    9/16/2020         RE: Suzy Rodríguez  [address]  [facility] [address]        Dear Colleague,    Thank you for referring your patient, Suzy Rodríguez, to the [facility]. Please see a copy of my visit note below.    MEDICAL ONCOLOGY CLINIC NOTE    REFERRING PROVIDER: Warren Mansfield MD from [facility] Urologic Oncology clinic.      REASON FOR CURRENT VISIT: Follow-up for adrenocortical carcinoma, currently on adjuvant mitotane.    HISTORY OF PRESENT ILLNESS: Ms. 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     Suzy increased mitotane dose to 1500mg PO BID since 8/5/20 visit and reports hot flashes (several times a day), previously anorexia, gaining weight, nausea, headaches every day, bloating x 1 week. Fatigue moderate but improving. No slurred speech but does have \"loss of words\". BP is 117/81. Also has involuntary tremors similar (but less severe) than what she had with the higher doses in the past. Also on hydrocortisone 30mg QAM, 20mg early PM; and fluodrocortisone. Feels jittery after hydrocortisone doses.     No more nausea with mitotane so she stopped taking compazine. Denies any fever, diarrhea, dizziness, wt loss. Has also come off clonazepam and medical marijuana. No clinical signs/symptoms of recurrence.     Last visit (virtual) with Dr. Hilton at [facility] was on 3/24/20 and next planned for Oct 2020. She also follows with Dr. Veena Jaquez in our endocrinology department. No clinical evidence of disease recurrence.     ONCOLOGIC HISTORY:  1. Right adrenocortical carcinoma, localized, high-risk (Ki67 20%; adrenal capsular invasion present, mitotic rate 15 per 50 HPF):  - Presented to emergency room on 5/8/2018 with acute onset left flank pain.   - CT abdomen and pelvis stone protocol without contrast 5 8018 showed \"9.2 x 8 cm " "heterogeneous right upper quadrant mass with small foci of calcification within it which is likely arising from the adrenal gland though inseparable from liver and upper pole of right kidney. It is incompletely evaluated on this noncontrast study. 3 x 2.6 cm mass right lobe of liver on image #85 also incompletely evaluated. 6 x 4 x 4 mm upper third left ureteral obstructing stone with mild to moderate secondary hydronephrosis. Collection of stones at lower pole left kidney extending over an area of approximately 6 x 7 x 4 mm. Additional nonobstructing 1 mm stone upper pole left kidney. 2 mm nonobstructing stone noted upper pole right kidney with no hydronephrosis on the right. Postop change consistent with gastric bypass. Noncontrast images of spleen, left adrenal gland, gallbladder, and pancreas unremarkable. No aneurysm. No adenopathy.\"  - Seen by Dr. Delvalle at [facility] U[facility] clinic. Referred to Dr. Alvino Patel and then Dr. Warren Mansfield.  - Endocrinology team directed workup showed high DHEAS level of 740 pre-surgery.   - Right open adrenalectomy and hepatic mobilization performed by Dr. Warren Mansfield on 6/25/2018. Pathology showed adrenocortical carcinoma measuring 11.3 cm, weighing 413 g with extension into or through the adrenal capsule negative lymphovascular invasion, tumor necrosis present, margins involved by tumor, no regional lymph nodes identified, pathologic stage T2 NX.  pathology review reveals low grade ACC.  - DHEAS normalized postsurgery.   - Adjuvant Mitotane started on 7/24/18. Dose increased to 2000mg TID around 10/23/18 due to persistently low troughs. Held 1/16/19 for two weeks and resumed 1/30 at 1000 mg po BID due to very poor tolerance of higher doses. Highest mitotane level was 10.2 on 2/11/19.  - Saw radiation oncology at [facility], radiation oncology at [facility], as well as endocrine oncology (Dr. Huertas) at [facility].   - " S/p adjuvant RT by Dr. Monsivais - 5040 cGy over 30 fr (18-10/6/18).  - 19: OSH CT C/A/P and MRI brain with contrast - no evidence of disease recurrence.   - 20, 20: CT C/A/P with contrast - AFUA.    REVIEW OF SYSTEMS: 14 point ROS negative other than the symptoms noted above in the HPI.    PAST MEDICAL HISTORY:  Past Medical History:   Diagnosis Date     Adrenal cortex cancer, right (H) 2018    Resected 18, Dr. Mansfield.     Adrenal mass (H)      Chocolate cyst of ovary      History of miscarriage      Malignant neoplasm of cortex of right adrenal gland (H) 2018    EOTD; 19.  Check in May. SCP started.  Overview:  Overview:    Adrenal cortical carcinoma, 11.3 cm s/p resection (anterior laporotomy) 2018   Cushing's syndrome, secondary to #1 (300 mcg/24 hr); Rx hydrocortisone 20 + 10 post op   Post operative defect right hemidiaphragm with ?worsening right effussion, not present preop   Currently on mitotane, 500 mg, BID x 1 week + hydrocortisone     MTHFR mutation (H)    MHTFR mutation with h/o mutiple miscarriages.    PAST SURGICAL HISTORY:   Past Surgical History:   Procedure Laterality Date     ADRENALECTOMY Right 2018    Procedure: ADRENALECTOMY;  Right Adrenalectomy,  Embolize Liver , Anesthesia Block;  Surgeon: Warren Mansfield MD;  Location: [facility] OR     ADRENALECTOMY        SECTION      twice      SECTION      2     EMBOLECTOMY ABDOMEN N/A 2018    Procedure: EMBOLECTOMY ABDOMEN;;  Surgeon: Ector Mcintyre MD;  Location: [facility] OR     EXTRACORPOREAL SHOCK WAVE LITHOTRIPSY (ESWL)       GASTRIC BYPASS  2016      SOCIAL HISTORY:   Social History     Tobacco Use     Smoking status: Never Smoker     Smokeless tobacco: Never Used   Substance Use Topics     Alcohol use: Yes     Comment: occ.     Drug use: No     FAMILY HISTORY:   Family History   Problem Relation Age of Onset     Depression Paternal Grandmother      ALLERGIES:    Allergies   Allergen Reactions     Bee Venom Swelling     Ibuprofen Hives and Swelling     CURRENT MEDICATIONS:   Current Outpatient Medications:      acetaminophen (TYLENOL) 325 MG tablet, Take 2 tablets (650 mg) by mouth every 4 hours as needed for other (multimodal surgical pain management along with NSAIDS and opioid medication as indicated based on pain control and physical function.), Disp: 150 tablet, Rfl: 0     buPROPion (WELLBUTRIN) 100 MG tablet, TAKE 1 TABLET (100 MG TOTAL) BY MOUTH 2 (TWO) TIMES A DAY., Disp: , Rfl: 3     calcium carbonate (OS-SHASHA) 500 MG tablet, Take 1 tablet (500 mg) by mouth 2 times daily, Disp: 180 tablet, Rfl: 3     cholecalciferol (VITAMIN D-1000 MAX ST) 1000 units TABS, Take 2,000 Units by mouth every morning , Disp: , Rfl:      diazepam (VALIUM) 5 MG tablet, Take 1 tablet (5 mg) by mouth once as needed for anxiety (MRI claustrophobia management), Disp: 2 tablet, Rfl: 3     EPINEPHrine (EPIPEN/ADRENACLICK/OR ANY BX GENERIC EQUIV) 0.3 MG/0.3ML injection 2-pack, As directed for bee stings, Disp: , Rfl:      ferrous fumarate 65 mg, [place]. FE,-Vitamin C 125 mg (VITRON-C)  MG TABS tablet, Take 1 tablet by mouth 3 times daily, Disp: 270 tablet, Rfl: 1     fludrocortisone (FLORINEF) 0.1 MG tablet, Take 1 tablet (0.1 mg) by mouth daily, Disp: 90 tablet, Rfl: 3     FLUoxetine (PROZAC) 40 MG capsule, Take 60 mg by mouth daily , Disp: , Rfl:      gabapentin (NEURONTIN) 300 MG capsule, Take 300 mg by mouth 3 times daily, Disp: , Rfl:      hydrocortisone (CORTEF) 10 MG tablet, Take 3 tablets (30 MG) every morning and take 2 tablets (20 MG) by mouth every day at 2 PM. Additional as directed., Disp: 500 tablet, Rfl: 3     levothyroxine (SYNTHROID/LEVOTHROID) 125 MCG tablet, Take 1 tablet (125 mcg) by mouth daily, Disp: 90 tablet, Rfl: 3     mitotane (LYSODREN) 500 MG tablet, 1500mg in morning plus 1000 in evening for an total daily dose of 2500 mg., Disp: 150 tablet, Rfl: 2     Multiple  Vitamins-Calcium (ONE-A-DAY WOMENS PO), Take 2 tablets by mouth every morning , Disp: , Rfl:      ondansetron (ZOFRAN) 8 MG tablet, Take 1 tablet (8 mg) by mouth every 8 hours as needed for nausea, Disp: 30 tablet, Rfl: 0     prochlorperazine (COMPAZINE) 5 MG tablet, Take 1 tablet (5 mg) by mouth every 8 hours as needed for nausea or vomiting, Disp: 90 tablet, Rfl: 3     traZODone (DESYREL) 50 MG tablet, Take 2 tablets (100 mg) by mouth nightly as needed for sleep, Disp: 60 tablet, Rfl: 0     UNABLE TO FIND, medical cannabis (Patient's own supply. Not a prescription), Disp: , Rfl:      vitamin B1 (THIAMINE) 50 MG tablet, Take 1 tablet (50 mg) by mouth daily, Disp: 90 tablet, Rfl: 1    PHYSICAL EXAMINATION:  Vital signs: /82 (BP Location: Right arm, Patient Position: Sitting, Cuff Size: Adult Regular)   Pulse 84   Temp 98.3  F (36.8  C)   Resp 16   Wt 69.4 kg (153 lb 1.6 oz)   SpO2 100%   BMI 26.27 kg/m    ECOG performance status of 1.   GENERAL: Young lady, sitting in chair. Appears tremulous, but in no acute distress.  HEENT: No icterus, no pallor. MMM, PERRL, OP clear.   NECK: Supple, no JVD/LAD.  LUNGS: CTAB, normal WOB on RA.  CARDIOVASCULAR: Regular rate and rhythm, no murmurs.   ABDOMEN: Soft, NT, ND, no masses appreciated, normal BS.   EXTREMITIES: No cyanosis, no clubbing, no edema.   NEUROLOGIC: Alert and fully oriented. Hands and feet are tremulous.   PSYCH: Affect flat, mood slightly anxious/depressed    LABORATORY DATA:   Lab Test 09/16/20  1437 08/05/20  0736 07/16/20  0723   WBC 5.6 4.3 4.1   RBC 4.49 4.61 4.31   HGB 14.5 14.5 13.4   HCT 43.4 44.6 41.9   MCV 97 97 97   MCH 32.3 31.5 31.1   MCHC 33.4 32.5 32.0   RDW 13.6 14.3 14.6    142* 183   NEUTROPHIL 80.6 54.6 52.4    141 139   POTASSIUM 3.4 3.2* 3.4   CHLORIDE 107 108 107   CO2 26 24 29   ANIONGAP 7 8 3   GLC 90 102* 75   BUN 10 10 10   CR 0.76 0.88 0.74   SHASHA 7.8* 8.1* 8.1*   PROTTOTAL 6.0* 6.0* 5.4*   ALBUMIN 3.0* 3.0*  2.7*   BILITOTAL 0.3 0.3 0.2   ALKPHOS 85 82 71   AST 22 16 18   ALT 27 21 26     Lab Test 09/16/20  1437 08/05/20  0736 07/16/20  0723 06/08/20  1352 03/30/20  1120   TSH 0.63 0.75 0.72 0.22* 0.37*   T4 1.02 0.96 0.85 1.06 1.06     Lab Test 09/16/20  1437 08/05/20  0736   CHOL 175 188    114   LDL 38 51   TRIG 145 118     DHEAS was 740 on 6/5/18, and has been <15 on 7/12/18, 8/20/18, 9/19/18, 10/23/18, 11/27/18, 1/9/19, 5/8/19, 6/18/19, 7/17/19, 11/15/19, 12/12/19, 1/22/20, 3/30/20 and 6/8/20, 7/21/20, 8/5/20.  Mitotane level (target 14-20): 1.8ng/dl on 8/20/18, 3.5 on 10/25/18, 6.2 on 12/4/18, 8.1 on 1/9/19 and 10.2 on 2/11/19, 14.2 on 6/18/19. 10.8 on 12/12/19.  10 on 1/24/20. 8.9 on 5/14/20. 8.6 on 7/21/20, 8.3 on 8/5/20.   Labs from [facility] 9/24/19 - WBC 4200, ANC 2600, Hb 10.4, Platelets 218K, lytes WNL, creat 0.61, Calcium 8, albumin 3.5, LFTs normal, mitotane level 18.8, cortisol 25.8, ACTH<5, aldosterone 4.9, free T4 1.17, TSH 0.07, DHEAS <15, renin plasma 10.6.     ASSESSMENT AND PLAN: A 33-year-old lady with right-sided functioning adrenocortical carcinoma.     Adrenocortical carcinoma:  - Started on adjuvant mitotane in July 2018 based on her presentation and tumor characteristics including invasion of the adrenal capsule, high mitotic rate, possibly positive margins, and high Ki67, which could result in a rate of recurrence as high as 40%.    - Restaging CT C/A/P from 9/14/20 showed no evidence of disease recurrence.  - She's > 2 years out from surgery and explained that this is an excellent finding as recurrence rates drop quite a bit after year 2.  - Despite multiple supportive care interventions, patient has NOT been able to tolerate higher doses (above 3gm/day) of mitotane. She insists on continuing 1500mg PO BID despite side effects as above.  - Will follow-up on mitotane levels from today. Advised to monitor closely.   - Monthly Mitotane level, CBC, comprehensive panel, TSH, FT4, and DHEAS,  and periodic cholesterol panel, 24-hour Urine Free Cortisol.   - Plan to continue adjuvant Mitotane for up to 5 yrs if tolerated.  - Next restaging CT C/A/P in ~Dec 2020/2021.     Headache, slurred speech:  - Likely due to mitotane but will get an MRI brain with contrast to rule out any alternative etiologies. Monitor for new/worsening symptoms.    Hot flashes:  - Will check estradiol and FSH today but low likelihood of premature menopause.    Endocrinopathies:  - Mgmt per endocrine team at the . Follow-up planned in the next couple months.    Insomnia:  - Trazodone is working well but she's taking 150mg at bedtime. Wants to come off but doesn't remember exact dose.  - She'll send us a [employer] message with exact dose to help prescribe a taper.   - EKG with QTc 467 ms in 2020.      Return to see me in 6 weeks.     BILLIN    Benson Cintron M.D.  . Professor of Medicine  Genitourinary Oncology  Division of Hematology, Oncology & Transplantation  [facility]       normal...

## 2020-09-29 NOTE — ASU PATIENT PROFILE, ADULT - NS TRANSFER EYEGLASSES PAIRS
Seven years since the last stress test   Fourteen years since heart bypass surgery  He can walk very fast because of back discomfort  At a [facility] will be ordered 
Tolerating daily rosuvastatin 
1 pair

## 2020-10-16 ENCOUNTER — APPOINTMENT (OUTPATIENT)
Dept: CARDIOLOGY | Facility: CLINIC | Age: 30
End: 2020-10-16

## 2020-10-24 ENCOUNTER — APPOINTMENT (OUTPATIENT)
Dept: CARDIOLOGY | Facility: CLINIC | Age: 30
End: 2020-10-24
Payer: MEDICAID

## 2020-10-24 PROCEDURE — 99072 ADDL SUPL MATRL&STAF TM PHE: CPT

## 2020-10-24 PROCEDURE — 93306 TTE W/DOPPLER COMPLETE: CPT

## 2021-06-15 NOTE — ASU PATIENT PROFILE, ADULT - NS PRO ABUSE SCREEN SUSPICION NEGLECT YN
I have prescribed some NSAIDs to be used for pain.    Use the nausea medications approximately 30 minutes prior to the the pain medications.    Keep your appointment with urology tomorrow.    Return here for any other questions or concerns.  
no

## 2021-08-26 ENCOUNTER — NON-APPOINTMENT (OUTPATIENT)
Age: 31
End: 2021-08-26

## 2021-08-26 ENCOUNTER — APPOINTMENT (OUTPATIENT)
Dept: CARDIOLOGY | Facility: CLINIC | Age: 31
End: 2021-08-26
Payer: MEDICAID

## 2021-08-26 VITALS
HEIGHT: 73 IN | SYSTOLIC BLOOD PRESSURE: 106 MMHG | DIASTOLIC BLOOD PRESSURE: 72 MMHG | BODY MASS INDEX: 26.9 KG/M2 | HEART RATE: 83 BPM | WEIGHT: 203 LBS | RESPIRATION RATE: 16 BRPM

## 2021-08-26 PROCEDURE — 93000 ELECTROCARDIOGRAM COMPLETE: CPT

## 2021-08-26 PROCEDURE — 99213 OFFICE O/P EST LOW 20 MIN: CPT

## 2021-08-26 RX ORDER — RISPERIDONE 0.5 MG/1
TABLET ORAL
Refills: 0 | Status: DISCONTINUED | COMMUNITY
End: 2021-08-26

## 2021-08-26 RX ORDER — DIVALPROEX SODIUM 500 MG/1
TABLET, DELAYED RELEASE ORAL
Refills: 0 | Status: DISCONTINUED | COMMUNITY
End: 2021-08-26

## 2021-08-26 NOTE — HISTORY OF PRESENT ILLNESS
[FreeTextEntry1] : Patient presents back today feeling very well and offering no complaints.  He continues to exercise regularly although he admits that he has not been doing so in the last couple of weeks.  He has had no further issues with chest pains at all.  No other new symptoms.  Patient denies shortness of breath, palpitations, orthopnea, presyncope, syncope.

## 2021-08-26 NOTE — ASSESSMENT
[FreeTextEntry1] : Echocardiogram December 27, 2018 showed left ventricle normal in size and function with ejection fraction of 55-60%. Right ventricle is moderately dilated with moderately reduced function. Right atrium is moderately dilated. No significant valvulopathy. Aortic root borderline dilated. Right-sided chambers appear further dilated compared to prior study.\par \par Echocardiogram October 24, 2020 demonstrated left ventricle normal in size and function with ejection fraction of 55 to 60%.  Moderately large right ventricle with mildly reduced function.  Mildly dilated right atrium.  No significant valve disease.  ASD repair appears durable with no residual shunting.  Minimal dilatation of the aortic root.\par \par EKG:  SR with iRBBB.\par \par 30 y/o male with PMHx ASD s/p closure, right sided cardiomegaly presenting for f/u.  Patient is asymptomatic at this time. No evidence of CHF.  Echocardiogram appears stable with possibly some improvement in right ventricular function and decrease in right atrial size.  Aortic root is minimally dilated.  Patient appears otherwise stable.  Blood pressures well controlled.  Lipids are also well controlled although his HDL is a little low.  I encouraged him to get back into exercising.

## 2021-08-26 NOTE — DISCUSSION/SUMMARY
[FreeTextEntry1] : 1.  No additional cardiac testing at this time.\par 2.  No additional cardiac medications at this time.\par 3.  Patient is encouraged to exercise at least 30 minutes a day everyday of the week.\par 4.  No additional cardiac medications at this time.\par 5.  Follow up here in one year or as needed.  Repeat echocardiogram at that time.

## 2022-03-31 ENCOUNTER — NON-APPOINTMENT (OUTPATIENT)
Age: 32
End: 2022-03-31

## 2022-03-31 ENCOUNTER — APPOINTMENT (OUTPATIENT)
Dept: CARDIOLOGY | Facility: CLINIC | Age: 32
End: 2022-03-31
Payer: MEDICAID

## 2022-03-31 VITALS
DIASTOLIC BLOOD PRESSURE: 79 MMHG | BODY MASS INDEX: 26.64 KG/M2 | RESPIRATION RATE: 16 BRPM | HEART RATE: 74 BPM | WEIGHT: 201 LBS | OXYGEN SATURATION: 98 % | SYSTOLIC BLOOD PRESSURE: 117 MMHG | HEIGHT: 73 IN

## 2022-03-31 PROCEDURE — 93000 ELECTROCARDIOGRAM COMPLETE: CPT

## 2022-03-31 PROCEDURE — 99213 OFFICE O/P EST LOW 20 MIN: CPT

## 2022-03-31 RX ORDER — PALIPERIDONE PALMITATE 234 MG/1.5ML
INJECTION INTRAMUSCULAR
Refills: 0 | Status: DISCONTINUED | COMMUNITY
End: 2022-03-31

## 2022-03-31 NOTE — DISCUSSION/SUMMARY
[FreeTextEntry1] : 1.  Plan echocardiogram later in the year to reevaluate his right side and his ASD closure.  I will call with results of that when it is done.\par 2.  No additional cardiac medications at this time.\par 3.  Patient is encouraged to exercise at least 30 minutes a day everyday of the week.\par 4.  No additional cardiac medications at this time.\par 5.  Blood work with his primary.\par 6.  Plan follow-up approximately a year after his echocardiogram in September.

## 2022-03-31 NOTE — ASSESSMENT
[FreeTextEntry1] : Echocardiogram December 27, 2018 showed left ventricle normal in size and function with ejection fraction of 55-60%. Right ventricle is moderately dilated with moderately reduced function. Right atrium is moderately dilated. No significant valvulopathy. Aortic root borderline dilated. Right-sided chambers appear further dilated compared to prior study.\par \par Echocardiogram October 24, 2020 demonstrated left ventricle normal in size and function with ejection fraction of 55 to 60%.  Moderately large right ventricle with mildly reduced function.  Mildly dilated right atrium.  No significant valve disease.  ASD repair appears durable with no residual shunting.  Minimal dilatation of the aortic root.\par \par EKG:  SR with iRBBB.\par \par 30y/o male with PMHx ASD s/p closure, right sided cardiomegaly presenting for f/u.  Patient reports some vague chest pain at this time which appears to be musculoskeletal in nature.  I certainly do not believe there is any cardiac cause.  I would like to consider repeating his echocardiogram towards the end of this year which will be about 2 years from his last echo when his RV seem to be improved.  If it has continued to improve likely we can push the next echo out further.  There is certainly no evidence of heart failure at this time.  Blood pressure appears to be controlled.

## 2022-09-21 ENCOUNTER — APPOINTMENT (OUTPATIENT)
Dept: CARDIOLOGY | Facility: CLINIC | Age: 32
End: 2022-09-21

## 2022-10-03 ENCOUNTER — APPOINTMENT (OUTPATIENT)
Dept: CARDIOLOGY | Facility: CLINIC | Age: 32
End: 2022-10-03

## 2022-10-03 PROCEDURE — 93306 TTE W/DOPPLER COMPLETE: CPT

## 2022-10-06 ENCOUNTER — APPOINTMENT (OUTPATIENT)
Dept: CARDIOLOGY | Facility: CLINIC | Age: 32
End: 2022-10-06

## 2023-02-09 NOTE — ED ADULT NURSE NOTE - PT NEEDS ASSIST
[FreeTextEntry1] : HCM\par Labs reviewed with pt today\par No urinary symptoms\par Gyn, mammogram, derm utd\par Tdap utd 6 years ago\par f/u prn or 1 year
no

## 2023-06-27 ENCOUNTER — NON-APPOINTMENT (OUTPATIENT)
Age: 33
End: 2023-06-27

## 2023-06-27 ENCOUNTER — APPOINTMENT (OUTPATIENT)
Dept: CARDIOLOGY | Facility: CLINIC | Age: 33
End: 2023-06-27
Payer: MEDICAID

## 2023-06-27 VITALS
SYSTOLIC BLOOD PRESSURE: 117 MMHG | HEART RATE: 79 BPM | WEIGHT: 204 LBS | HEIGHT: 73 IN | DIASTOLIC BLOOD PRESSURE: 80 MMHG | RESPIRATION RATE: 16 BRPM | BODY MASS INDEX: 27.04 KG/M2

## 2023-06-27 PROCEDURE — 99214 OFFICE O/P EST MOD 30 MIN: CPT | Mod: 25

## 2023-06-27 PROCEDURE — 93000 ELECTROCARDIOGRAM COMPLETE: CPT | Mod: NC

## 2023-06-27 RX ORDER — FERROUS SULFATE TAB EC 324 MG (65 MG FE EQUIVALENT) 324 (65 FE) MG
324 (65 FE) TABLET DELAYED RESPONSE ORAL
Refills: 0 | Status: ACTIVE | COMMUNITY

## 2023-06-27 RX ORDER — PALIPERIDONE PALMITATE 156 MG/ML
156 INJECTION INTRAMUSCULAR
Refills: 0 | Status: ACTIVE | COMMUNITY

## 2023-06-27 RX ORDER — VALACYCLOVIR HYDROCHLORIDE 1 G/1
TABLET, FILM COATED ORAL
Refills: 0 | Status: ACTIVE | COMMUNITY

## 2023-06-27 NOTE — ASSESSMENT
[FreeTextEntry1] : Echocardiogram December 27, 2018 showed left ventricle normal in size and function with ejection fraction of 55-60%. Right ventricle is moderately dilated with moderately reduced function. Right atrium is moderately dilated. No significant valvulopathy. Aortic root borderline dilated. Right-sided chambers appear further dilated compared to prior study.\par \par Echocardiogram October 24, 2020 demonstrated left ventricle normal in size and function with ejection fraction of 55 to 60%.  Moderately large right ventricle with mildly reduced function.  Mildly dilated right atrium.  No significant valve disease.  ASD repair appears durable with no residual shunting.  Minimal dilatation of the aortic root.\par \par Echocardiogram October 3, 2022 demonstrated logical normal size and function with ejection fraction of 60 to 65%.  Mildly enlarged right ventricle with low normal RV function.  Trace mitral and pulmonic regurgitation.  Status post ASD closure with no evidence of residual shunting.\par \par EKG:  SR with iRBBB.  No ST or T wave changes.\par \par 30y/o male with PMHx ASD s/p closure, right sided cardiomegaly presenting for f/u.  Patient appears to be doing well from a cardiovascular standpoint.  He is having no further issues with chest pain at all.  Echocardiogram from last year shows continued improvement in right-sided size and function to now near normal.  ASD closure appears durable.  He is now planned to go endoscopy and colonoscopy because of anemia.  There is certainly no cardiac contraindication at low cardiovascular risk at this time.  Blood pressure is well controlled.

## 2023-06-27 NOTE — DISCUSSION/SUMMARY
[FreeTextEntry1] : 1.  Proceed with endoscopy and colonoscopy as planned.\par 2.  Monitor through the procedure until stable post procedurally.\par 3.  No additional cardiac testing at this time.\par 4.  No additional cardiac medications at this time.\par 5.  Patient is encouraged to exercise at least 30 minutes a day everyday of the week.\par 6.  No additional cardiac medications at this time.\par 7.  Will obtain most recent bloodwork.\par 8.  Follow up here in one year or as needed. [EKG obtained to assist in diagnosis and management of assessed problem(s)] : EKG obtained to assist in diagnosis and management of assessed problem(s)

## 2023-06-27 NOTE — HISTORY OF PRESENT ILLNESS
[FreeTextEntry1] : Patient presents back to the office today having been found recently to have anemia and planning to undergo endoscopy and colonoscopy.  He is feeling physically well.  He continues to exercise regularly without any symptoms or limitations at all.  Patient denies chest pain, shortness of breath, palpitations, orthopnea, presyncope, syncope.

## 2024-02-27 ENCOUNTER — APPOINTMENT (OUTPATIENT)
Dept: CARDIOLOGY | Facility: CLINIC | Age: 34
End: 2024-02-27
Payer: MEDICAID

## 2024-02-27 VITALS
RESPIRATION RATE: 16 BRPM | HEIGHT: 73 IN | DIASTOLIC BLOOD PRESSURE: 72 MMHG | HEART RATE: 87 BPM | BODY MASS INDEX: 27.3 KG/M2 | WEIGHT: 206 LBS | SYSTOLIC BLOOD PRESSURE: 105 MMHG

## 2024-02-27 DIAGNOSIS — Q21.10 ATRIAL SEPTAL DEFECT, UNSPECIFIED: ICD-10-CM

## 2024-02-27 DIAGNOSIS — I51.7 CARDIOMEGALY: ICD-10-CM

## 2024-02-27 PROCEDURE — 93000 ELECTROCARDIOGRAM COMPLETE: CPT

## 2024-02-27 PROCEDURE — G2211 COMPLEX E/M VISIT ADD ON: CPT | Mod: NC

## 2024-02-27 PROCEDURE — 99214 OFFICE O/P EST MOD 30 MIN: CPT

## 2024-02-27 RX ORDER — ZINC SULFATE 50(220)MG
CAPSULE ORAL
Refills: 0 | Status: DISCONTINUED | COMMUNITY
End: 2024-02-27

## 2024-02-27 NOTE — HISTORY OF PRESENT ILLNESS
[FreeTextEntry1] : Patient presents back to the office today now needing another colonoscopy.  He had an endoscopy and colonoscopy back in October but now needs another colonoscopy.  The reasons not entirely clear but he says they are just observing him.  He reports that sometimes when he exercises he still gets some lower left chest discomfort but he believes this to be more muscular in nature.  He also still has issues with indigestion in his epigastrium.  No other physical symptoms at this time.  Patient denies shortness of breath, palpitations, orthopnea, presyncope, syncope.

## 2024-02-27 NOTE — ASSESSMENT
[FreeTextEntry1] : Echocardiogram December 27, 2018 showed left ventricle normal in size and function with ejection fraction of 55-60%. Right ventricle is moderately dilated with moderately reduced function. Right atrium is moderately dilated. No significant valvulopathy. Aortic root borderline dilated. Right-sided chambers appear further dilated compared to prior study.  Echocardiogram October 24, 2020 demonstrated left ventricle normal in size and function with ejection fraction of 55 to 60%.  Moderately large right ventricle with mildly reduced function.  Mildly dilated right atrium.  No significant valve disease.  ASD repair appears durable with no residual shunting.  Minimal dilatation of the aortic root.  Echocardiogram October 3, 2022 demonstrated logical normal size and function with ejection fraction of 60 to 65%.  Mildly enlarged right ventricle with low normal RV function.  Trace mitral and pulmonic regurgitation.  Status post ASD closure with no evidence of residual shunting.  EKG:  SR with iRBBB.  No ST or T wave changes.  32y/o male with PMHx ASD s/p closure, right sided cardiomegaly presenting for f/u.  Patient appears to be stable from a cardiovascular standpoint.  He is now planning to undergo another colonoscopy.  He still does well above 4 METS of activity without any symptoms or limitations.  His chest pains continue appear to be musculoskeletal and there is no indication of a cardiac cause.  There is no cardiac contraindication at low cardiovascular risk for this procedure.  Blood pressure is well-controlled.

## 2024-02-27 NOTE — PHYSICAL EXAM
[General Appearance - In No Acute Distress] : no acute distress [Normal Conjunctiva] : the conjunctiva exhibited no abnormalities [Normal Oral Mucosa] : normal oral mucosa [Auscultation Breath Sounds / Voice Sounds] : lungs were clear to auscultation bilaterally [Normal Rate] : normal [Rhythm Regular] : regular [Normal S1] : normal S1 [Normal S2] : normal S2 [II] : a grade 2 [Abdomen Soft] : soft [Abdomen Tenderness] : non-tender [Abnormal Walk] : normal gait [Cyanosis, Localized] : no localized cyanosis [Nail Clubbing] : no clubbing of the fingernails [Skin Color & Pigmentation] : normal skin color and pigmentation [Oriented To Time, Place, And Person] : oriented to person, place, and time [Affect] : the affect was normal [FreeTextEntry1] : No JVD, no carotid bruits. [S4] : no S4 [S3] : no S3

## 2024-02-27 NOTE — DISCUSSION/SUMMARY
[EKG obtained to assist in diagnosis and management of assessed problem(s)] : EKG obtained to assist in diagnosis and management of assessed problem(s) [FreeTextEntry1] : 1.  Proceed with colonoscopy as planned. 2.  Monitor through the procedure until stable post procedurally. 3.  No additional cardiac testing at this time. 4.  No additional cardiac medications at this time. 5.  Patient is encouraged to exercise at least 30 minutes a day everyday of the week. 6.  No additional cardiac medications at this time. 7.  Patient will have blood work done. 8.  Follow up here in one year or as needed.

## 2024-12-16 ENCOUNTER — TRANSCRIPTION ENCOUNTER (OUTPATIENT)
Age: 34
End: 2024-12-16

## 2024-12-16 ENCOUNTER — APPOINTMENT (OUTPATIENT)
Dept: CARDIOLOGY | Facility: CLINIC | Age: 34
End: 2024-12-16
Payer: MEDICARE

## 2024-12-16 ENCOUNTER — NON-APPOINTMENT (OUTPATIENT)
Age: 34
End: 2024-12-16

## 2024-12-16 VITALS
RESPIRATION RATE: 16 BRPM | SYSTOLIC BLOOD PRESSURE: 121 MMHG | HEART RATE: 72 BPM | DIASTOLIC BLOOD PRESSURE: 74 MMHG | HEIGHT: 73 IN

## 2024-12-16 DIAGNOSIS — I51.7 CARDIOMEGALY: ICD-10-CM

## 2024-12-16 DIAGNOSIS — Q21.10 ATRIAL SEPTAL DEFECT, UNSPECIFIED: ICD-10-CM

## 2024-12-16 DIAGNOSIS — F20.9 SCHIZOPHRENIA, UNSPECIFIED: ICD-10-CM

## 2024-12-16 PROCEDURE — 93000 ELECTROCARDIOGRAM COMPLETE: CPT

## 2024-12-16 PROCEDURE — G2211 COMPLEX E/M VISIT ADD ON: CPT

## 2024-12-16 PROCEDURE — 99204 OFFICE O/P NEW MOD 45 MIN: CPT

## 2024-12-16 RX ORDER — FLUPHENAZINE HYDROCHLORIDE 1 MG/1
1 TABLET, FILM COATED ORAL
Refills: 0 | Status: ACTIVE | COMMUNITY
Start: 2024-12-16

## 2024-12-16 RX ORDER — BENZTROPINE MESYLATE 1 MG/1
1 TABLET ORAL
Refills: 0 | Status: ACTIVE | COMMUNITY
Start: 2024-12-16

## 2024-12-16 RX ORDER — INFLIXIMAB-DYYB 100 MG/10ML
100 INJECTION, POWDER, LYOPHILIZED, FOR SOLUTION INTRAVENOUS
Refills: 0 | Status: ACTIVE | COMMUNITY
Start: 2024-12-16

## 2024-12-20 ENCOUNTER — TRANSCRIPTION ENCOUNTER (OUTPATIENT)
Age: 34
End: 2024-12-20

## 2025-04-10 NOTE — HISTORY OF PRESENT ILLNESS
Pulmonary  Pt seen/examined  Awake on vent  30% 02  Makes urine    Current Facility-Administered Medications   Medication Dose Route Frequency Provider Last Rate Last Admin    baclofen (LIORESAL) tablet 10 mg  10 mg Oral TID Filemon Martin MD   10 mg at 04/10/25 0805    pantoprazole (PROTONIX) 40 MG/20ML (compounded) suspension 40 mg  40 mg Per PEG Tube 2 times per day Stefany Herrera MD   40 mg at 04/10/25 0804    midodrine (PROAMATINE) tablet 10 mg  10 mg Per OG Tube 3 times per day Serenity Aguero MD   10 mg at 04/10/25 0522    docusate sodium-sennosides (SENOKOT S) 50-8.6 MG 1 tablet  1 tablet Oral Nightly Serenity Aguero MD   1 tablet at 04/08/25 2006    iohexol ORAL (OMNIPAQUE 350) oral contrast solution 12.5 mL  12.5 mL Oral Once Serenity Aguero MD        melatonin tablet 6 mg  6 mg Oral Nightly Serenity Aguero MD   6 mg at 04/09/25 2100    gabapentin (NEURONTIN) 250 MG/5ML solution 100 mg  100 mg Per NG Tube Nightly Serenity Aguero MD   100 mg at 04/09/25 2029    CARBOXYMethylcellulose PF (REFRESH PLUS) 0.5 % ophthalmic solution 1 drop  1 drop Both Eyes Q4H Marija De Leon CNP   1 drop at 04/10/25 0805    sodium chloride 0.9 % injection 10 mL  10 mL Injection 2 times per day Aldo Thurman DO   10 mL at 04/10/25 0806    insulin lispro (ADMELOG,HumaLOG) - Correction Dose   Subcutaneous 4 times per day Filemon Martin MD   2 Units at 04/06/25 1835    aspirin chewable 81 mg  81 mg Oral Daily Yolette Menchaca DO   81 mg at 04/10/25 0805    Potassium Standard Replacement Protocol (Levels 3.5 and lower)   Does not apply See Admin Instructions Yolette Menchaca DO        Potassium Replacement (Levels 3.6 - 4)   Does not apply See Admin Instructions Yolette Menchaca DO        polyethylene glycol (MIRALAX) packet 17 g  17 g Oral Daily Marija De Leon CNP   17 g at 04/09/25 0807    sodium chloride 0.9 % injection 2 mL  2 mL Intracatheter 2 times per day Dina Bishop MD   2 mL at 04/10/25 0806    enoxaparin  (LOVENOX) injection 40 mg  40 mg Subcutaneous Daily Dina Bishop MD   40 mg at 04/10/25 0804    atorvastatin (LIPITOR) tablet 80 mg  80 mg Oral Nightly Dina Bishop MD   80 mg at 04/09/25 2029      Vitals with min/max:      Vital Last Value 24 Hour Range   Temperature 99.5 °F (37.5 °C) (04/10/25 0800) Temp  Min: 97.5 °F (36.4 °C)  Max: 99.7 °F (37.6 °C)   Pulse 69 (04/10/25 0900) Pulse  Min: 55  Max: 76   Respiratory 20 (04/10/25 0900) Resp  Min: 7  Max: 29   Non-Invasive  Blood Pressure 138/70 (04/10/25 0900) BP  Min: 108/66  Max: 142/72   Pulse Oximetry 97 % (04/10/25 0900) SpO2  Min: 95 %  Max: 100 %   Arterial   Blood Pressure   No data recorded        Intake/Output Summary (Last 24 hours) at 4/10/2025 1005  Last data filed at 4/10/2025 0800  Gross per 24 hour   Intake 636 ml   Output 950 ml   Net -314 ml      Gen:awake on vent  Ext: no cce  Ent:anicteric  Cv:rrr, hs soft  Lung:dec bs bilaterally  Abd:benign, gtube+, bs+    Component      Latest Ref Rng 4/10/2025  4:51 AM 4/10/2025  7:34 AM 4/10/2025  7:35 AM   WBC      4.2 - 11.0 K/mcL  11.7 (H)     RBC      4.50 - 5.90 mil/mcL  3.88 (L)     HGB      13.0 - 17.0 g/dL  12.6 (L)     HCT      39.0 - 51.0 %  36.7 (L)     MCV      78.0 - 100.0 fl  94.6     MCH      26.0 - 34.0 pg  32.5     MCHC      32.0 - 36.5 g/dL  34.3     RDW-CV      11.0 - 15.0 %  12.2     RDW-SD      39.0 - 50.0 fL  42.1     PLT      140 - 450 K/mcL  217     NRBC      <=0 /100 WBC  0     Neutrophil      %  68     LYMPH      %  18     MONO      %  10     EOSIN      %  2     BASO      %  1     Immature Granulocytes      %  1     Absolute Neutrophil      1.8 - 7.7 K/mcL  8.1 (H)     Absolute Lymph      1.0 - 4.0 K/mcL  2.1     Absolute Mono      0.3 - 0.9 K/mcL  1.2 (H)     Absolute Eos      0.0 - 0.5 K/mcL  0.2     Absolute Baso      0.0 - 0.3 K/mcL  0.1     Absolute Immature Granulocytes      0.0 - 0.2 K/mcL  0.1     Sodium      135 - 145 mmol/L   137    Potassium      3.4 - 5.1 mmol/L   4.1     Chloride      97 - 110 mmol/L   105    CO2      21 - 32 mmol/L   30    ANION GAP      7 - 19 mmol/L   6 (L)    Glucose      70 - 99 mg/dL   111 (H)    BUN      6 - 20 mg/dL   41 (H)    Creatinine      0.67 - 1.17 mg/dL   0.91    Glomerular Filtration Rate      >=60    >90    BUN/CREATININE RATIO      7 - 25    45 (H)    CALCIUM      8.4 - 10.2 mg/dL   8.9    TOTAL BILIRUBIN      0.2 - 1.0 mg/dL   0.9    AST/SGOT      <=37 Units/L   28    ALT/SGPT      <64 Units/L   35    ALK PHOSPHATASE      45 - 117 Units/L   59    Albumin      3.4 - 5.0 g/dL   2.8 (L)    TOTAL PROTEIN      6.4 - 8.2 g/dL   6.8    GLOBULIN      2.0 - 4.0 g/dL   4.0    A/G Ratio, Serum      1.0 - 2.4    0.7 (L)    MAGNESIUM      1.7 - 2.4 mg/dL   2.5 (H)    PHOSPHORUS      2.4 - 4.7 mg/dL   2.7    Glucometer Glucose      70 - 99 mg/dL 131 (H)         Legend:  (H) High  (L) Low    A/p:59 m w/  1)pontine cva  2)acute hypoxemic respiratory failure    Rec:  S/p trach  Supportive care  Ltac eval  D/w wife at bedside     [FreeTextEntry1] : Patient presents back to the office today complaining of some recent chest discomfort.  He said for about a month he has been having some chest discomfort when he leans forward or turns from side to side or when he coughs.  He feels this may have been some pulled muscles from his exercising.  He notes that today it seems to be better.  He reports no other recent chest pain or any other symptoms.  Patient denies shortness of breath, palpitations, orthopnea, presyncope, syncope.

## 2025-06-20 ENCOUNTER — TRANSCRIPTION ENCOUNTER (OUTPATIENT)
Age: 35
End: 2025-06-20